# Patient Record
Sex: MALE | URBAN - METROPOLITAN AREA
[De-identification: names, ages, dates, MRNs, and addresses within clinical notes are randomized per-mention and may not be internally consistent; named-entity substitution may affect disease eponyms.]

---

## 2018-03-22 ENCOUNTER — TRANSFERRED RECORDS (OUTPATIENT)
Dept: HEALTH INFORMATION MANAGEMENT | Facility: CLINIC | Age: 23
End: 2018-03-22

## 2018-03-30 ENCOUNTER — MEDICAL CORRESPONDENCE (OUTPATIENT)
Dept: HEALTH INFORMATION MANAGEMENT | Facility: CLINIC | Age: 23
End: 2018-03-30

## 2018-03-30 ENCOUNTER — TRANSFERRED RECORDS (OUTPATIENT)
Dept: HEALTH INFORMATION MANAGEMENT | Facility: CLINIC | Age: 23
End: 2018-03-30

## 2018-03-31 ENCOUNTER — RADIANT APPOINTMENT (OUTPATIENT)
Dept: ULTRASOUND IMAGING | Facility: CLINIC | Age: 23
End: 2018-03-31
Attending: FAMILY MEDICINE
Payer: COMMERCIAL

## 2018-03-31 DIAGNOSIS — R59.0 PREAURICULAR LYMPHADENOPATHY: ICD-10-CM

## 2018-03-31 DIAGNOSIS — R59.0 POSTERIOR CERVICAL LYMPHADENOPATHY: ICD-10-CM

## 2018-04-03 NOTE — TELEPHONE ENCOUNTER
FUTURE VISIT INFORMATION      FUTURE VISIT INFORMATION:    Date: 4/4/18    Time: 3:20PM    Location: [facility] E[facility]  REFERRAL INFORMATION:    Referring provider:  Dr Crystal Chisholm    Referring providers clinic:  [facility]    Reason for visit/diagnosis  Swollen Lymph Nodes    RECORDS REQUESTED FROM:       Clinic name Comments Records Status Imaging Status   [facility] 3/31/18 US Head Neck EPIC PACS   [facility] 4/2/18 visit notes Dr Chisholm Scanned 3/30                              RECORDS STATUS

## 2018-04-04 ENCOUNTER — OFFICE VISIT (OUTPATIENT)
Dept: OTOLARYNGOLOGY | Facility: CLINIC | Age: 23
End: 2018-04-04
Payer: COMMERCIAL

## 2018-04-04 ENCOUNTER — PRE VISIT (OUTPATIENT)
Dept: OTOLARYNGOLOGY | Facility: CLINIC | Age: 23
End: 2018-04-04

## 2018-04-04 VITALS
HEART RATE: 77 BPM | BODY MASS INDEX: 21.7 KG/M2 | TEMPERATURE: 98.4 F | OXYGEN SATURATION: 99 % | WEIGHT: 155 LBS | SYSTOLIC BLOOD PRESSURE: 132 MMHG | DIASTOLIC BLOOD PRESSURE: 77 MMHG | HEIGHT: 71 IN

## 2018-04-04 DIAGNOSIS — R59.1 LYMPHADENOPATHY: Primary | ICD-10-CM

## 2018-04-04 ASSESSMENT — PAIN SCALES - GENERAL: PAINLEVEL: NO PAIN (0)

## 2018-04-04 NOTE — PATIENT INSTRUCTIONS
1. Call if you would like to proceed with an ultrasound guided biopsy.   2. Please call the ENT clinic with any questions,concerns, new or worsening symptoms.    -Clinic number is [phone]   - Lety's direct line (Dr. Rob's nurse) [phone]

## 2018-04-04 NOTE — MR AVS SNAPSHOT
After Visit Summary   2018    Eren Rodas    MRN: [id number]           Patient Information     Date Of Birth          1995        Visit Information        Provider Department      2018 3:20 PM Jessica Rob MD [facility]        Today's Diagnoses     Lymphadenopathy    -  1      Care Instructions    1. Call if you would like to proceed with an ultrasound guided biopsy.   2. Please call the ENT clinic with any questions,concerns, new or worsening symptoms.    -Clinic number is [phone]   - Let[facility] direct line (Dr. Rob's nurse) [phone]              Follow-ups after your visit        Who to contact     Please call your clinic at [phone] to:    Ask questions about your health    Make or cancel appointments    Discuss your medicines    Learn about your test results    Speak to your doctor            Additional Information About Your Visit        MyChart Information     BeatSwitch is an electronic gateway that provides easy, online access to your medical records. With BeatSwitch, you can request a clinic appointment, read your test results, renew a prescription or communicate with your care team.     To sign up for [email or web address] visit the website at [email or web address]   You will be asked to enter the access code listed below, as well as some personal information. Please follow the directions to create your username and password.     Your access code is: [email or web address]  Expires: 2018  6:31 AM     Your access code will  in 90 days. If you need help or a new code, please contact your [facility] or call [phone] for assistance.        Care EveryWhere ID     This is your Care EveryWhere ID. This could be used by other organizations to access your [place] medical records  [id number]        Your Vitals Were     Pulse Temperature Height Pulse Oximetry BMI (Body Mass Index)       77 98.4  F (36.9  C) 1.791 m (5'  "10.5\") 99% 21.93 kg/m2        Blood Pressure from Last 3 Encounters:   04/04/18 132/77    Weight from Last 3 Encounters:   04/04/18 70.3 kg (155 lb)              We Performed the Following     IMAGESTREAM RECORDING ORDER        Primary Care Provider Office Phone # Fax #    [facility] MD Ambrose [phone]       [address] [place] [address]        Equal Access to Services     VENECIA AVALOS : Hadii aad ku hadasho Soomaali, waaxda luqadaha, qaybta kaalmada adeegyada, waxay idiin hayaan adeeg kharash la'juliocsear . So [facility] [phone].    ATENCIÓN: Si habla español, tiene a capone disposición servicios gratuitos de asistencia lingüística. Llame al [phone].    We comply with applicable federal civil rights laws and [place] laws. We do not discriminate on the basis of race, color, national origin, age, disability, sex, sexual orientation, or gender identity.            Thank you!     Thank you for choosing [facility]  for your care. Our goal is always to provide you with excellent care. Hearing back from our patients is one way we can continue to improve our services. Please take a few minutes to complete the written survey that you may receive in the mail after your visit with us. Thank you!             Your Updated Medication List - Protect others around you: Learn how to safely use, store and throw away your medicines at www.disposemymeds.org.      Notice  As of 4/4/2018 11:59 PM    You have not been prescribed any medications.      "

## 2018-04-04 NOTE — NURSING NOTE
Chief Complaint   Patient presents with     Consult     Swollen lymph nodes     Ivelisse Alexis Medical Assistant

## 2018-04-04 NOTE — LETTER
4/4/2018       RE: Eren Rodas  [address]  [facility] [address]     Dear Colleague,    Thank you for referring your patient, Eren Rodas, to the [facility] at [facility]. Please see a copy of my visit note below.    Dear Dr. Chisholm:    I had the pleasure of meeting Eren Rodas in consultation today at the [facility] at your request.     History of Present Illness:   Eren Rodas is a 22-year-old man who is referred for evaluation of lymphadenopathy.  He initially noticed it in June 2017.  At that point he was in [place].  They thought that he had a throat infection and she was placed on antibiotics.  It decreased in size but persisted.  He actually had an FNA sometime in June or July in [facility] which was reportedly consistent with viral reactive lymphadenopathy.  We do not have these results.  He was seen at [facility] for the lymphadenopathy.  He had an ultrasound performed which demonstrated small nodes with normal fatty hilum without obvious concerning features.  He is not having any pain associated with it.  He has occasional right ear pain and some headaches.  Otherwise he is having a sore throat, dysphagia, odynophagia, weight loss.  He denies any fevers, chills, night sweats.  His sleep is an abnormal pattern so does have fatigue but this is likely related to going to bed at 4 AM and getting up at 11 AM.    Past surgical history: Negative next    Past medical history: Negative, states he is otherwise healthy    Family history: Negative for head and neck cancers    Social history: Denies smoking, chewing tobacco, rare alcohol use.  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    MEDICATIONS:     No current outpatient prescriptions on file.       ALLERGIES:  Not on File    HABITS/SOCIAL HISTORY:   Denies smoking, chewing tobacco, rare alcohol use.   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    Social History     Social History     Marital status: Single     Spouse name: N/A     Number of children: N/A     Years of education: N/A     Occupational History     Not on file.     Social History Main Topics     Smoking status: Never Smoker     Smokeless tobacco: Never Used     Alcohol use Yes     Drug use: No     Sexual activity: No     Other Topics Concern     Not on file     Social History Narrative     No narrative on file       PAST MEDICAL HISTORY: No past medical history on file.     PAST SURGICAL HISTORY: No past surgical history on file.    FAMILY HISTORY:  No family history on file.    REVIEW OF SYSTEMS:  12 point ROS was negative other than the symptoms noted above in the HPI.  Patient Supplied Answers to Review of Systems  [facility] ROS 4/4/2018   Neurology Headache   Ears, Nose, Throat Nasal congestion or drainage   Hematologic Lymph node swelling         PHYSICAL EXAMINATION:   /77  Pulse 77  Temp 98.4  F (36.9  C)  Ht 1.791 m (5' 10.5\")  Wt 70.3 kg (155 lb)  SpO2 99%  BMI 21.93 kg/m2  Appearance:   normal; NAD, age-appropriate appearance, well-developed, normal habitus   Communication:   normal; communicates verbally, normal voice quality   Head/Face:   inspection -  Normal; no scars or visible lesions   Salivary glands -  Normal size, no tenderness, swelling, or palpable masses   Facial strength -  Normal and symmetric bilateral; H/B I/VI   Skin:  normal, no rash   Ocular Motility:  normal occular movements   Ears:  auricle (AD) -  normal  EAC (AD) -  normal  TM (AD) -  Normal, no effusion  auricle (AS) -  normal  EAC (AS) -  normal  TM (AS) -  Normal, no effusion  Normal clinical speech reception   Nose:  Ext. inspection -  Normal  Internal Inspection -  Normal mucosa, septum, and turbinates   Nasopharynx:  small amount of midline residual adenoid tissue   Oral Cavity:  lips -  Normal mucosa, oral " competence, and stoma size   Age-appropriate dentition, healthy gingival mucosa   Hard palate, buccal, floor of mouth mucosa normal   Tongue - normal movement, no lesions   Oropharynx:  mucosa -  Normal, no lesions  soft palate -  Normal, no lesions, no asymmetry, normal elevation  tonsils -  Normal, no exudates, no abnormal lesions, symmetric   Hypopharynx:  Normal pyriform sinus and pharyngeal wall mucosa   No pooled secretions    Larynx:  Epiglottis, false vocal cord, true vocal cord normal in appearance, bilaterally mobile cords    Neck: No visible mass or asymmetry   Normal palpation, no tenderness  Normal range of motion   Lymphatic:  approximately 1-1.5 cm node in right level V, freely mobile  Few other smaller nodes in right neck   Cardiovascular:  warm, pink, well-perfused extremities without swelling, tenderness, or edema   Respiratory:  Normal respiratory effort, no stridor   Neuro/Psych.:  mood/affect -  normal  mental status -  normal  cranial nerves -  normal        PROCEDURES:   Flexible fiberoptic laryngoscopy: Scope exam was indicated due to lymphadenopathy. Verbal consent was obtained. The nasal cavity was prepped with an aerosolized solution of topical anesthetic and vasoconstrictive agent. The scope was passed through the anterior nasal cavity and advanced. Inspection of the nasopharynx revealed no gross abnormality. Inspection of the larynx revealed bilaterally mobile vocal cords. Pyriform sinuses are symmetric. No intra-arytenoid irregularities noted. The epiglottis, aryepiglottic folds, vallecula and base of tongue are unremarkable. The airway is patent. Procedure tolerated well with no immediate complications noted.      RESULTS REVIEWED:   I reviewed the referral records from [facility] along with the U/S report    Right:  Level 2: There is a 1.5 x 0.5 x 2.2 cm hypoechoic node with fatty  hilum and no internal vascularity.  Level 3: No significant lymphadenopathy.  Level 4: No  significant lymphadenopathy.  Level 5:   a. There is a 1.5 x 0.3 x 1.6 cm hypoechoic node with thin fatty hilum  and no substantial vascularity. This node is felt by the patient.  b. There is a 0.6 x 0.2 x 0.8 cm hypoechoic node within fatty hilum  and minimal associated vascularity. This node is felt by the patient.  Level 6: No significant lymphadenopathy.  Level 7: No significant lymphadenopathy.  The periauricular region there is a 0.7 x 0.2 x 0.8 cm hypoechoic node  with fatty hilum and no internal vascularity.     Left:  Level 2: There is a 1.2 x 0.7 x 1.4 cm hypoechoic node without fatty  hilum or substantial internal vascularity.  Level 3: No significant lymphadenopathy.  Level 4: No significant lymphadenopathy.  Level 5: There is a 0.9 x 0.5 x 0.7 cm hypoechoic node with a fatty  hilum and no internal vascularity.  Level 6: No significant lymphadenopathy.  Level 7: No significant lymphadenopathy.     The right lobe of the thyroid measures 1.7 x 2.0 x 4.8 cm. The isthmus  measures 2.5 mm in depth. The left lobe of the thyroid measures 1.6 x  1.4 x 4.5 cm. Thyroid parenchyma is homogeneous.         IMPRESSION:  1. Soft tissue neck ultrasound with lymph node measurements as  described above. No lymph nodes with suspicious characteristics.  2. Limited evaluation of the thyroid demonstrates no focal  abnormality.    IMPRESSION AND PLAN:   Eren Melo is a 22-year-old man with cervical lymphadenopathy.  We discussed that it certainly reassuring that it has decreased in size and his ultrasound report is consistent with reactive process and has no concerning features.  I discussed with him that he has already had an FNA that was reportedly negative in [place] which we do not the results of.  We can certainly offer him a repeat FNA if he desires.  He would like to think about this and will let us know.  He can be scheduled in clinic at his convenience for that he would like to proceed.  Alternatively he can go  undergo observation and certainly should be reevaluated if the nodes increase in size.    CC:  Crystal Chisholm MD  [address] [address]      Again, thank you for allowing me to participate in the care of your patient.      Sincerely,    Jessica Rob MD

## 2018-04-07 NOTE — PROGRESS NOTES
Dear Dr. Chisholm:    I had the pleasure of meeting Eren Rodas in consultation today at the [facility] at your request.     History of Present Illness:   Eren Rodas is a 22-year-old man who is referred for evaluation of lymphadenopathy.  He initially noticed it in June 2017.  At that point he was in [place].  They thought that he had a throat infection and she was placed on antibiotics.  It decreased in size but persisted.  He actually had an FNA sometime in June or July in [place] which was reportedly consistent with viral reactive lymphadenopathy.  We do not have these results.  He was seen at [facility] for the lymphadenopathy.  He had an ultrasound performed which demonstrated small nodes with normal fatty hilum without obvious concerning features.  He is not having any pain associated with it.  He has occasional right ear pain and some headaches.  Otherwise he is having a sore throat, dysphagia, odynophagia, weight loss.  He denies any fevers, chills, night sweats.  His sleep is an abnormal pattern so does have fatigue but this is likely related to going to bed at 4 AM and getting up at 11 AM.    Past surgical history: Negative next    Past medical history: Negative, states he is otherwise healthy    Family history: Negative for head and neck cancers    Social history: Denies smoking, chewing tobacco, rare alcohol use.  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    MEDICATIONS:     No current outpatient prescriptions on file.       ALLERGIES:  Not on File    HABITS/SOCIAL HISTORY:   Denies smoking, chewing tobacco, rare alcohol use.    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    Social History     Social History     Marital status: Single     Spouse name: N/A     Number of children: N/A     Years of education: N/A     Occupational History     Not on file.     Social History Main  "Topics     Smoking status: Never Smoker     Smokeless tobacco: Never Used     Alcohol use Yes     Drug use: No     Sexual activity: No     Other Topics Concern     Not on file     Social History Narrative     No narrative on file       PAST MEDICAL HISTORY: No past medical history on file.     PAST SURGICAL HISTORY: No past surgical history on file.    FAMILY HISTORY:  No family history on file.    REVIEW OF SYSTEMS:  12 point ROS was negative other than the symptoms noted above in the HPI.  Patient Supplied Answers to Review of Systems  [facility] ROS 4/4/2018   Neurology Headache   Ears, Nose, Throat Nasal congestion or drainage   Hematologic Lymph node swelling         PHYSICAL EXAMINATION:   /77  Pulse 77  Temp 98.4  F (36.9  C)  Ht 1.791 m (5' 10.5\")  Wt 70.3 kg (155 lb)  SpO2 99%  BMI 21.93 kg/m2  Appearance:   normal; NAD, age-appropriate appearance, well-developed, normal habitus   Communication:   normal; communicates verbally, normal voice quality   Head/Face:   inspection -  Normal; no scars or visible lesions   Salivary glands -  Normal size, no tenderness, swelling, or palpable masses   Facial strength -  Normal and symmetric bilateral; H/B I/VI   Skin:  normal, no rash   Ocular Motility:  normal occular movements   Ears:  auricle (AD) -  normal  EAC (AD) -  normal  TM (AD) -  Normal, no effusion  auricle (AS) -  normal  EAC (AS) -  normal  TM (AS) -  Normal, no effusion  Normal clinical speech reception   Nose:  Ext. inspection -  Normal  Internal Inspection -  Normal mucosa, septum, and turbinates   Nasopharynx:  small amount of midline residual adenoid tissue   Oral Cavity:  lips -  Normal mucosa, oral competence, and stoma size   Age-appropriate dentition, healthy gingival mucosa   Hard palate, buccal, floor of mouth mucosa normal   Tongue - normal movement, no lesions   Oropharynx:  mucosa -  Normal, no lesions  soft palate -  Normal, no lesions, no asymmetry, normal elevation  tonsils -  " Normal, no exudates, no abnormal lesions, symmetric   Hypopharynx:  Normal pyriform sinus and pharyngeal wall mucosa   No pooled secretions    Larynx:  Epiglottis, false vocal cord, true vocal cord normal in appearance, bilaterally mobile cords    Neck: No visible mass or asymmetry   Normal palpation, no tenderness  Normal range of motion   Lymphatic:  approximately 1-1.5 cm node in right level V, freely mobile  Few other smaller nodes in right neck   Cardiovascular:  warm, pink, well-perfused extremities without swelling, tenderness, or edema   Respiratory:  Normal respiratory effort, no stridor   Neuro/Psych.:  mood/affect -  normal  mental status -  normal  cranial nerves -  normal        PROCEDURES:   Flexible fiberoptic laryngoscopy: Scope exam was indicated due to lymphadenopathy. Verbal consent was obtained. The nasal cavity was prepped with an aerosolized solution of topical anesthetic and vasoconstrictive agent. The scope was passed through the anterior nasal cavity and advanced. Inspection of the nasopharynx revealed no gross abnormality. Inspection of the larynx revealed bilaterally mobile vocal cords. Pyriform sinuses are symmetric. No intra-arytenoid irregularities noted. The epiglottis, aryepiglottic folds, vallecula and base of tongue are unremarkable. The airway is patent. Procedure tolerated well with no immediate complications noted.      RESULTS REVIEWED:   I reviewed the referral records from [facility] along with the U/S report    Right:  Level 2: There is a 1.5 x 0.5 x 2.2 cm hypoechoic node with fatty  hilum and no internal vascularity.  Level 3: No significant lymphadenopathy.  Level 4: No significant lymphadenopathy.  Level 5:   a. There is a 1.5 x 0.3 x 1.6 cm hypoechoic node with thin fatty hilum  and no substantial vascularity. This node is felt by the patient.  b. There is a 0.6 x 0.2 x 0.8 cm hypoechoic node within fatty hilum  and minimal associated vascularity. This node is felt  by the patient.  Level 6: No significant lymphadenopathy.  Level 7: No significant lymphadenopathy.  The periauricular region there is a 0.7 x 0.2 x 0.8 cm hypoechoic node  with fatty hilum and no internal vascularity.     Left:  Level 2: There is a 1.2 x 0.7 x 1.4 cm hypoechoic node without fatty  hilum or substantial internal vascularity.  Level 3: No significant lymphadenopathy.  Level 4: No significant lymphadenopathy.  Level 5: There is a 0.9 x 0.5 x 0.7 cm hypoechoic node with a fatty  hilum and no internal vascularity.  Level 6: No significant lymphadenopathy.  Level 7: No significant lymphadenopathy.     The right lobe of the thyroid measures 1.7 x 2.0 x 4.8 cm. The isthmus  measures 2.5 mm in depth. The left lobe of the thyroid measures 1.6 x  1.4 x 4.5 cm. Thyroid parenchyma is homogeneous.         IMPRESSION:  1. Soft tissue neck ultrasound with lymph node measurements as  described above. No lymph nodes with suspicious characteristics.  2. Limited evaluation of the thyroid demonstrates no focal  abnormality.    IMPRESSION AND PLAN:   Eren Melo is a 22-year-old man with cervical lymphadenopathy.  We discussed that it certainly reassuring that it has decreased in size and his ultrasound report is consistent with reactive process and has no concerning features.  I discussed with him that he has already had an FNA that was reportedly negative in [place] which we do not the results of.  We can certainly offer him a repeat FNA if he desires.  He would like to think about this and will let us know.  He can be scheduled in clinic at his convenience for that he would like to proceed.  Alternatively he can go undergo observation and certainly should be reevaluated if the nodes increase in size.    Thank you very much for the opportunity to participate in the care of your patient.      Jessica Rob M.D.  Otolaryngology- Head & Neck Surgery        CC:  Crystal Chisholm MD  [facility]  [address]  [facility] [address]

## 2019-02-15 ENCOUNTER — MEDICAL CORRESPONDENCE (OUTPATIENT)
Dept: HEALTH INFORMATION MANAGEMENT | Facility: CLINIC | Age: 24
End: 2019-02-15

## 2019-02-15 ENCOUNTER — TELEPHONE (OUTPATIENT)
Dept: ORTHOPEDICS | Facility: CLINIC | Age: 24
End: 2019-02-15

## 2019-02-15 ENCOUNTER — TRANSFERRED RECORDS (OUTPATIENT)
Dept: HEALTH INFORMATION MANAGEMENT | Facility: CLINIC | Age: 24
End: 2019-02-15

## 2019-02-15 NOTE — TELEPHONE ENCOUNTER
[facility] Call Center    Phone Message    May a detailed message be left on voicemail: yes    Reason for Call: Other: [facility] with ortho/spine specialist for Lumbar Radiculopathy. Pt has a scheduled MRI on 2/18/2019. Pt wants to schedule an appt with a spine surgeon to go over MRI but when asked, Pt does not prefer surgery over conservative care. Please follow up with Pt to discuss scheduling options and how the appt should be scheduled (with surgeon or not).      Action Taken: Message routed to:  [facility] ([facility]o Clinic

## 2019-02-18 ENCOUNTER — HOSPITAL ENCOUNTER (OUTPATIENT)
Dept: MRI IMAGING | Facility: CLINIC | Age: 24
Discharge: HOME | End: 2019-02-18
Attending: INTERNAL MEDICINE | Admitting: INTERNAL MEDICINE
Payer: COMMERCIAL

## 2019-02-18 DIAGNOSIS — M54.16 LUMBAR RADICULOPATHY: ICD-10-CM

## 2019-02-18 PROCEDURE — 72148 MRI LUMBAR SPINE W/O DYE: CPT

## 2019-02-20 DIAGNOSIS — M54.50 LUMBAR PAIN: Primary | ICD-10-CM

## 2019-02-20 NOTE — TELEPHONE ENCOUNTER
RECORDS RECEIVED FROM: Chronic Lumbar radiculopathy, referred by Dr. Pitts at [facility], records and referral will be sent - per pt   DATE RECEIVED: 02/19/19    NOTES STATUS DETAILS   OFFICE NOTE from referring provider Received Dr. Pitts 1/28/19   OFFICE NOTE from other specialist N/A    DISCHARGE SUMMARY from hospital N/A    DISCHARGE REPORT from the ER N/A    OPERATIVE REPORT N/A    MEDICATION LIST Received    IMPLANT RECORD/STICKER N/A    LABS     CBC/DIFF N/A    CULTURES N/A    INJECTIONS DONE IN RADIOLOGY N/A    MRI Received 2/18/19   CT SCAN N/A    XRAYS (IMAGES & REPORTS) N/A    TUMOR     PATHOLOGY  Slides & report N/A

## 2019-02-22 ENCOUNTER — PRE VISIT (OUTPATIENT)
Dept: ORTHOPEDICS | Facility: CLINIC | Age: 24
End: 2019-02-22

## 2019-04-12 ENCOUNTER — ANCILLARY PROCEDURE (OUTPATIENT)
Dept: GENERAL RADIOLOGY | Facility: CLINIC | Age: 24
End: 2019-04-12
Attending: ORTHOPAEDIC SURGERY
Payer: COMMERCIAL

## 2019-04-12 ENCOUNTER — OFFICE VISIT (OUTPATIENT)
Dept: ORTHOPEDICS | Facility: CLINIC | Age: 24
End: 2019-04-12
Payer: COMMERCIAL

## 2019-04-12 VITALS — HEIGHT: 70 IN | BODY MASS INDEX: 21.76 KG/M2 | WEIGHT: 152 LBS

## 2019-04-12 DIAGNOSIS — M79.2 NERVE PAIN: Primary | ICD-10-CM

## 2019-04-12 ASSESSMENT — ENCOUNTER SYMPTOMS
MYALGIAS: 0
ARTHRALGIAS: 0
NECK PAIN: 1
TINGLING: 1
BACK PAIN: 1
SEIZURES: 0
SPEECH CHANGE: 0
DIZZINESS: 0
LOSS OF CONSCIOUSNESS: 0
NUMBNESS: 1
WEAKNESS: 0
STIFFNESS: 1
TREMORS: 0
DISTURBANCES IN COORDINATION: 0
MUSCLE CRAMPS: 0
HEADACHES: 0
JOINT SWELLING: 0
MEMORY LOSS: 0
MUSCLE WEAKNESS: 0
PARALYSIS: 0

## 2019-04-12 ASSESSMENT — MIFFLIN-ST. JEOR: SCORE: 1675.72

## 2019-04-12 NOTE — PROGRESS NOTES
"Spine Surgery Consultation    REFERRING PHYSICIAN: Diego Pitts   PRIMARY CARE PHYSICIAN: [facility]           Chief Complaint:   Consult (chronic lumbar radiculopathy )      History of Present Illness:  Symptom Profile Including: location of symptoms, onset, severity, exacerbating/alleviating factors, previous treatments:        Eren Rodas is a 23 year old male who presents for evaluation of diffuse paresthesias in his legs which have been going on for a number of months but particularly worsening over the last 2 weeks.  They seem to come on whenever he is sitting for more than 10 minutes and they are also present at nighttime.  He does not notice them as much when he is up and moving.  Initially they were just in the legs but perhaps over the last week or 2 he has started noticing a somewhat in the hands as well.  He is been seen by his primary care doctor.  He has done some physical therapy.  No gabapentin.  No injections.         Past Medical History:   No past medical history on file.         Past Surgical History:   No past surgical history on file.         Social History:     Social History     Tobacco Use     Smoking status: Never Smoker     Smokeless tobacco: Never Used   Substance Use Topics     Alcohol use: Yes            Family History:   No family history on file.         Allergies:   No Known Allergies         Medications:     No current outpatient medications on file.     No current facility-administered medications for this visit.              Review of Systems:     A 10 point ROS was performed and reviewed. Specific responses to these questions are noted at the end of the document.         Physical Exam:   Vitals: Ht 1.77 m (5' 9.69\")   Wt 68.9 kg (152 lb)   BMI 22.01 kg/m    Constitutional: awake, alert, cooperative, no apparent distress, appears stated age.    Eyes: The sclera are white.  Ears, Nose, Throat: The trachea is midline.  Psychiatric: The patient has a normal " affect.  Respiratory: breathing non-labored  Cardiovascular: The extremities are warm and perfused.  Skin: no obvious rashes or lesions.  Musculoskeletal, Neurologic, and Spine:          Lumbar Spine:    Appearance - No gross stepoffs or deformities    Motor -     L2-3: Hip flexion 5/5 R and 5/5 L strength          L3/4:  Knee extension R 5/5 and L 5/5 strength         L4/5:  Foot dorsiflexion R 5/5 L 5/5 and       EHL dorsiflexion R 5/5 L 5/5 strength         S1:  Plantarflexion/Peroneal Muscles  R 5/5 and L 5/5 strength    Sensation: intact to light touch L3-S1 distribution BLE      Neurologic:      REFLEXES Left Right                  Patella 1+ 1+   Ankle jerk 1+ 1+   Babinski No upgoing great toe No upgoing great toe   Clonus 0 beats 0 beats     Hip Exam:  No pain with hip log roll and no tenderness over the greater trochanters.    Alignment:  Patient stands with a neutral standing sagittal balance.           Imaging:   We ordered and independently reviewed new radiographs at this clinic visit. The results were discussed with the patient.  Findings include:    April 12, 2019 lumbar AP lateral flexion-extension radiographs show preserved alignment.  No obvious fractures or instability.      Lumbar spine MRI February 18, 2019: No severe central or foraminal stenosis.             Assessment and Plan:   Assessment:  23 year old male with diffuse paresthesias of unclear etiology.  I see no spine pathology on his imaging studies.     Plan:  1. We a long discussion today about options.  I told him the possible diagnoses include restless leg symptoms or possible neuropathy.  One option would be to get an EMG study.  However he has a completely normal exam for me and I think the likelihood that a EMG would be positive is low.  Nonetheless if he did not improve I would probably recommend that he get this just to make sure we are not missing an underlying neuropathy.  In terms of treatment I recommended he start with  treating this is possible restless leg syndrome and try gabapentin and asked him to speak to his primary care doctor about this.  If he did not improve with gabapentin I would recommend a referral to neurology to try and work him up for a neuropathy.  An EMG would also be potentially reasonable at the time of that referral.  I see no indication for spine surgery in the lumbar spine at this time.      Respectfully,  Michele Soni MD  Spine Surgery  [facility]      Answers for HPI/ROS submitted by the patient on 4/12/2019   General Symptoms: No  Skin Symptoms: No  HENT Symptoms: No  EYE SYMPTOMS: No  HEART SYMPTOMS: No  LUNG SYMPTOMS: No  INTESTINAL SYMPTOMS: No  URINARY SYMPTOMS: No  REPRODUCTIVE SYMPTOMS: No  SKELETAL SYMPTOMS: Yes  BLOOD SYMPTOMS: No  NERVOUS SYSTEM SYMPTOMS: Yes  MENTAL HEALTH SYMPTOMS: No  Back pain: Yes  Muscle aches: No  Neck pain: Yes  Swollen joints: No  Joint pain: No  Bone pain: No  Muscle cramps: No  Muscle weakness: No  Joint stiffness: Yes  Bone fracture: No  Trouble with coordination: No  Dizziness or trouble with balance: No  Fainting or black-out spells: No  Memory loss: No  Headache: No  Seizures: No  Speech problems: No  Tingling: Yes  Tremor: No  Weakness: No  Difficulty walking: No  Paralysis: No  Numbness: Yes

## 2019-04-12 NOTE — NURSING NOTE
"Reason For Visit:   Chief Complaint   Patient presents with     Consult     chronic lumbar radiculopathy        Primary MD: [facility]  Ref. MD: kelly     ?  No  Occupation no .  Currently working? No.  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  Date of injury: january   Type of injury: chronic .  Date of surgery: none   Type of surgery: none .  Smoker: No  Request smoking cessation information: No    Ht 1.77 m (5' 9.69\")   Wt 68.9 kg (152 lb)   BMI 22.01 kg/m      Pain Assessment  Patient Currently in Pain: Yes  0-10 Pain Scale: 2  Primary Pain Location: Back  Pain Descriptors: Radiating(numbness on right foot )    Oswestry (MEMO) Questionnaire    OSWESTRY DISABILITY INDEX 4/12/2019   Count 9   Sum 16   Oswestry Score (%) 35.56            Neck Disability Index (NDI) Questionnaire    No flowsheet data found.                Promis 10 Assessment    PROMIS 10 4/12/2019   In general, would you say your health is: Very good   In general, would you say your quality of life is: Very good   In general, how would you rate your physical health? Good   In general, how would you rate your mental health, including your mood and your ability to think? Excellent   In general, how would you rate your satisfaction with your social activities and relationships? Very good   In general, please rate how well you carry out your usual social activities and roles Good   To what extent are you able to carry out your everyday physical activities such as walking, climbing stairs, carrying groceries, or moving a chair? Moderately   How often have you been bothered by emotional problems such as feeling anxious, depressed or irritable? Never   How would you rate your fatigue on average? None   How would you rate your pain on average?   0 = No Pain  to  10 = Worst Imaginable Pain 6   In general, would you say your health is: 4   In general, would you say your quality of life is: 4   In general, how would you rate your physical health? 3 "   In general, how would you rate your mental health, including your mood and your ability to think? 5   In general, how would you rate your satisfaction with your social activities and relationships? 4   In general, please rate how well you carry out your usual social activities and roles. (This includes activities at home, at work and in your community, and responsibilities as a parent, child, spouse, employee, friend, etc.) 3   To what extent are you able to carry out your everyday physical activities such as walking, climbing stairs, carrying groceries, or moving a chair? 3   In the past 7 days, how often have you been bothered by emotional problems such as feeling anxious, depressed, or irritable? 1   In the past 7 days, how would you rate your fatigue on average? 1   In the past 7 days, how would you rate your pain on average, where 0 means no pain, and 10 means worst imaginable pain? 6   Global Mental Health Score 18   Global Physical Health Score 14   PROMIS TOTAL - SUBSCORES 32                Rishi Cruz ATC

## 2019-04-12 NOTE — LETTER
4/12/2019       RE: Eren Rodas  [address]  [facility] [address]     Dear Colleague,    Thank you for referring your patient, Eren Rodas, to the [facility]. Please see a copy of my visit note below.    Spine Surgery Consultation    REFERRING PHYSICIAN: Diego Pitts   PRIMARY CARE PHYSICIAN: [facility]           Chief Complaint:   Consult (chronic lumbar radiculopathy )      History of Present Illness:  Symptom Profile Including: location of symptoms, onset, severity, exacerbating/alleviating factors, previous treatments:        Eren Rodas is a 23 year old male who presents for evaluation of diffuse paresthesias in his legs which have been going on for a number of months but particularly worsening over the last 2 weeks.  They seem to come on whenever he is sitting for more than 10 minutes and they are also present at nighttime.  He does not notice them as much when he is up and moving.  Initially they were just in the legs but perhaps over the last week or 2 he has started noticing a somewhat in the hands as well.  He is been seen by his primary care doctor.  He has done some physical therapy.  No gabapentin.  No injections.         Past Medical History:   No past medical history on file.         Past Surgical History:   No past surgical history on file.         Social History:     Social History     Tobacco Use     Smoking status: Never Smoker     Smokeless tobacco: Never Used   Substance Use Topics     Alcohol use: Yes            Family History:   No family history on file.         Allergies:   No Known Allergies         Medications:     No current outpatient medications on file.     No current facility-administered medications for this visit.              Review of Systems:     A 10 point ROS was performed and reviewed. Specific responses to these questions are noted at the end of the  "document.         Physical Exam:   Vitals: Ht 1.77 m (5' 9.69\")   Wt 68.9 kg (152 lb)   BMI 22.01 kg/m     Constitutional: awake, alert, cooperative, no apparent distress, appears stated age.    Eyes: The sclera are white.  Ears, Nose, Throat: The trachea is midline.  Psychiatric: The patient has a normal affect.  Respiratory: breathing non-labored  Cardiovascular: The extremities are warm and perfused.  Skin: no obvious rashes or lesions.  Musculoskeletal, Neurologic, and Spine:          Lumbar Spine:    Appearance - No gross stepoffs or deformities    Motor -     L2-3: Hip flexion 5/5 R and 5/5 L strength          L3/4:  Knee extension R 5/5 and L 5/5 strength         L4/5:  Foot dorsiflexion R 5/5 L 5/5 and       EHL dorsiflexion R 5/5 L 5/5 strength         S1:  Plantarflexion/Peroneal Muscles  R 5/5 and L 5/5 strength    Sensation: intact to light touch L3-S1 distribution BLE      Neurologic:      REFLEXES Left Right                  Patella 1+ 1+   Ankle jerk 1+ 1+   Babinski No upgoing great toe No upgoing great toe   Clonus 0 beats 0 beats     Hip Exam:  No pain with hip log roll and no tenderness over the greater trochanters.    Alignment:  Patient stands with a neutral standing sagittal balance.           Imaging:   We ordered and independently reviewed new radiographs at this clinic visit. The results were discussed with the patient.  Findings include:    April 12, 2019 lumbar AP lateral flexion-extension radiographs show preserved alignment.  No obvious fractures or instability.      Lumbar spine MRI February 18, 2019: No severe central or foraminal stenosis.             Assessment and Plan:   Assessment:  23 year old male with diffuse paresthesias of unclear etiology.  I see no spine pathology on his imaging studies.     Plan:  1. We a long discussion today about options.  I told him the possible diagnoses include restless leg symptoms or possible neuropathy.  One option would be to get an EMG study.  " However he has a completely normal exam for me and I think the likelihood that a EMG would be positive is low.  Nonetheless if he did not improve I would probably recommend that he get this just to make sure we are not missing an underlying neuropathy.  In terms of treatment I recommended he start with treating this is possible restless leg syndrome and try gabapentin and asked him to speak to his primary care doctor about this.  If he did not improve with gabapentin I would recommend a referral to neurology to try and work him up for a neuropathy.  An EMG would also be potentially reasonable at the time of that referral.  I see no indication for spine surgery in the lumbar spine at this time.    Respectfully,  Michele Soni MD  Spine Surgery  [facility]

## 2019-04-24 ENCOUNTER — OFFICE VISIT (OUTPATIENT)
Dept: NEUROLOGY | Facility: CLINIC | Age: 24
End: 2019-04-24
Attending: ORTHOPAEDIC SURGERY
Payer: COMMERCIAL

## 2019-04-24 VITALS
BODY MASS INDEX: 21.33 KG/M2 | TEMPERATURE: 98 F | HEART RATE: 82 BPM | DIASTOLIC BLOOD PRESSURE: 86 MMHG | SYSTOLIC BLOOD PRESSURE: 127 MMHG | RESPIRATION RATE: 16 BRPM | HEIGHT: 70 IN | WEIGHT: 149 LBS | OXYGEN SATURATION: 100 %

## 2019-04-24 DIAGNOSIS — R20.2 PARESTHESIAS: ICD-10-CM

## 2019-04-24 DIAGNOSIS — M54.6 ACUTE BILATERAL THORACIC BACK PAIN: Primary | ICD-10-CM

## 2019-04-24 DIAGNOSIS — R63.4 WEIGHT LOSS: ICD-10-CM

## 2019-04-24 ASSESSMENT — MIFFLIN-ST. JEOR: SCORE: 1662.11

## 2019-04-24 ASSESSMENT — PAIN SCALES - GENERAL: PAINLEVEL: NO PAIN (1)
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
## 2019-04-25 ENCOUNTER — HOSPITAL ENCOUNTER (OUTPATIENT)
Dept: MRI IMAGING | Facility: CLINIC | Age: 24
Discharge: HOME | End: 2019-04-25
Attending: PSYCHIATRY & NEUROLOGY | Admitting: PSYCHIATRY & NEUROLOGY
Payer: COMMERCIAL

## 2019-04-25 DIAGNOSIS — R20.2 PARESTHESIAS: ICD-10-CM

## 2019-04-25 DIAGNOSIS — R63.4 WEIGHT LOSS: ICD-10-CM

## 2019-04-25 DIAGNOSIS — M54.6 ACUTE BILATERAL THORACIC BACK PAIN: ICD-10-CM

## 2019-04-25 PROCEDURE — 72156 MRI NECK SPINE W/O & W/DYE: CPT

## 2019-04-25 PROCEDURE — 25500064 ZZH RX 255 OP 636: Performed by: PSYCHIATRY & NEUROLOGY

## 2019-04-25 PROCEDURE — A9585 GADOBUTROL INJECTION: HCPCS | Performed by: PSYCHIATRY & NEUROLOGY

## 2019-04-25 RX ORDER — GADOBUTROL 604.72 MG/ML
7.5 INJECTION INTRAVENOUS ONCE
Status: COMPLETED | OUTPATIENT
Start: 2019-04-25 | End: 2019-04-25

## 2019-04-25 RX ADMIN — GADOBUTROL 6.7 ML: 604.72 INJECTION INTRAVENOUS at 07:55

## 2019-04-26 ENCOUNTER — OFFICE VISIT (OUTPATIENT)
Dept: NEUROLOGY | Facility: CLINIC | Age: 24
End: 2019-04-26
Payer: COMMERCIAL

## 2019-04-26 ENCOUNTER — PRE VISIT (OUTPATIENT)
Dept: NEUROSURGERY | Facility: CLINIC | Age: 24
End: 2019-04-26

## 2019-04-26 ENCOUNTER — HOSPITAL ENCOUNTER (OUTPATIENT)
Dept: MRI IMAGING | Facility: CLINIC | Age: 24
Discharge: HOME | End: 2019-04-26
Attending: PSYCHIATRY & NEUROLOGY | Admitting: PSYCHIATRY & NEUROLOGY
Payer: COMMERCIAL

## 2019-04-26 VITALS
WEIGHT: 149 LBS | RESPIRATION RATE: 15 BRPM | DIASTOLIC BLOOD PRESSURE: 78 MMHG | TEMPERATURE: 98.1 F | HEART RATE: 81 BPM | BODY MASS INDEX: 21.33 KG/M2 | SYSTOLIC BLOOD PRESSURE: 132 MMHG | HEIGHT: 70 IN

## 2019-04-26 DIAGNOSIS — R20.2 PARESTHESIAS: ICD-10-CM

## 2019-04-26 DIAGNOSIS — R63.4 WEIGHT LOSS: ICD-10-CM

## 2019-04-26 DIAGNOSIS — M54.6 ACUTE BILATERAL THORACIC BACK PAIN: ICD-10-CM

## 2019-04-26 DIAGNOSIS — R63.4 WEIGHT LOSS: Primary | ICD-10-CM

## 2019-04-26 PROCEDURE — 72157 MRI CHEST SPINE W/O & W/DYE: CPT

## 2019-04-26 PROCEDURE — 25500064 ZZH RX 255 OP 636: Performed by: PSYCHIATRY & NEUROLOGY

## 2019-04-26 PROCEDURE — A9585 GADOBUTROL INJECTION: HCPCS | Performed by: PSYCHIATRY & NEUROLOGY

## 2019-04-26 RX ORDER — GADOBUTROL 604.72 MG/ML
7.5 INJECTION INTRAVENOUS ONCE
Status: COMPLETED | OUTPATIENT
Start: 2019-04-26 | End: 2019-04-26

## 2019-04-26 RX ADMIN — GADOBUTROL 7 ML: 604.72 INJECTION INTRAVENOUS at 12:23

## 2019-04-26 ASSESSMENT — PAIN SCALES - GENERAL: PAINLEVEL: NO PAIN (1)

## 2019-04-26 ASSESSMENT — MIFFLIN-ST. JEOR: SCORE: 1660.6

## 2019-04-26 NOTE — NURSING NOTE
Chief Complaint   Patient presents with     RECHECK     [facility] RETURN REVIEW IMAGES- NERVE PAIN       Davion Weber, EMT

## 2019-04-26 NOTE — TELEPHONE ENCOUNTER
"NEUROSURGERY PRE-VISIT DATA  NEUROSURGERY PRE-VISIT DATA  ON THE PHONE CALL     Date of call 04/26/2019   Date of appointment 06/13/2019   Reason for referral (match appointment comment) Acute bilateral thoracic back pain/ Paresthesias   Who made the initial call (patient, parent [name], referral source IN HOUSE REFFERAL   Name of referring provider DR. TOBI SILVA   Has the patient been seen for the referring problem?   If yes, by whom and where    Where and what tests have been done?      Previous surgeries for the  referred condition\"?  If yes, by whom and where  *Request operative reports(s).      REQUEST MEDICAL RECORDS     From where?    From whom (Specify Primary, Neurology, Neurosurgery, Orthopedics, ENT, DDS, Pain Mgt, Medical Oncologist, Radiation Oncologist)    Date of request ?    Who did you speak to?    Are records already in [facility] EPIC?  YES   Have pertinent, outside records received?  (OR notes, study reports and provider notes)      IMAGES and TESTS:     Has the patient had any images done? YES   What images? (Specify MRI, CT, X-ray, Bone scan, MRA/MRV, etc)  * Include When and where? YES   What tests? (EMG, EEG, lumbar puncture, etc)  *Include when and where      Are the images in PACS? YES   If not already in PACS, have images been pushed to PAC and when?        "

## 2019-04-26 NOTE — LETTER
2019       RE: Eren Rodas  [address]  [facility] [address]     Dear Colleague,    Thank you for referring your patient, Eren Rodas, to the [facility] NEUROLOGY at [facility]. Please see a copy of my visit note below.    Service Date: 2019      RE: Eren Rodas   MRN: [id number]   : 1995      Dear Dr. Soni:      This is in regard to followup on Eren Rodas.  The patient returned today with chief complaint of thoracic and lumbar spine pain as well as sensory disturbance involving his feet.      The patient urgently did have MRI scans done at my suggestion on  and 2019.  I reviewed these with him.  He had an MRI scan of the cervical spine that showed no abnormal enhancement within the spinal cord within the cecal sac nor in the cervical vertebrae.  He did have mild right foraminal narrowing at C3-C4 due to uncinate hypertrophy.  This was read by Dr. Linn.  The patient also had an MRI scan of the thoracic spine read by Dr. Anderson on the following day.  I went over this study also with him.  This showed mild accentuated kyphosis at T2-T3 with mild flattening of the spinal cord without definite myelopathic signal.  Dr. Anderson noted that he had mildly increased STIR signal and mild enhancement surrounding the basal vertebral vein in the T1 and T2 vertebrae which he presumed was benign.  I told the patient I was not certain as to what all this represented.  I did suggest he go ahead now and have further studies.      The patient denied to me again any serologic risk factors nor has he had any genital or oral ulcers.  He did review with me that he had started sleeping on the floor 4 weeks ago to help his low back pain, but that is when he started to have pain in his thoracic spine.  He said he has now stopped doing that.      The patient is going to go ahead and have  A cxr,CRP, sedimentation rate, ELP, MMA, B12  level as well as NMO done.  I have recommended that he have EMG testing and then have neurologic followup with me.  Depending on the test results, I will make other recommendations.      Thank you for allowing me to see this patient.       I spent 15 minutes with the patient today.  Over 50% of the time this involved counseling and coordination of care.        D: 2019   T: 2019   MT: AKA      Name:     CRIS RINCON   MRN:      [id number]        Account:      [id number]   :      1995           Service Date: 2019      Document: [id number]        Again, thank you for allowing me to participate in the care of your patient.      Sincerely,    Christoph Benrard MD    cc:   [address] [address]     Michele Soni MD   [address] [address]

## 2019-04-29 NOTE — PROGRESS NOTES
Service Date: 2019      Michele Soni MD   [address] [place] [address]      RE: Eren Rodas   MRN: [id number]   : 1995      Dear Dr. Soni:      This is in regard to followup on Eren Rodas.  The patient returned today with chief complaint of thoracic and lumbar spine pain as well as sensory disturbance involving his feet.      The patient urgently did have MRI scans done at my suggestion on  and 2019.  I reviewed these with him.  He had an MRI scan of the cervical spine that showed no abnormal enhancement within the spinal cord within the cecal sac nor in the cervical vertebrae.  He did have mild right foraminal narrowing at C3-C4 due to uncinate hypertrophy.  This was read by Dr. Linn.  The patient also had an MRI scan of the thoracic spine read by Dr. Anderson on the following day.  I went over this study also with him.  This showed mild accentuated kyphosis at T2-T3 with mild flattening of the spinal cord without definite myelopathic signal.  Dr. Anderson noted that he had mildly increased STIR signal and mild enhancement surrounding the basal vertebral vein in the T1 and T2 vertebrae which he presumed was benign.  I told the patient I was not certain as to what all this represented.  I did suggest he go ahead now and have further studies.      The patient denied to me again any serologic risk factors nor has he had any genital or oral ulcers.  He did review with me that he had started sleeping on the floor 4 weeks ago to help his low back pain, but that is when he started to have pain in his thoracic spine.  He said he has now stopped doing that.      The patient is going to go ahead and have  A cxr,CRP, sedimentation rate, ELP, MMA, B12 level as well as NMO done.  I have recommended that he have EMG testing and then have neurologic followup with me.  Depending on the test results, I will make other recommendations.      Thank you for  allowing me to see this patient.       Sincerely yours,       Tobi Bernard MD      I spent 15 minutes with the patient today.  Over 50% of the time this involved counseling and coordination of care.      cc:   [address] [address]         TOBI BERNARD MD             D: 2019   T: 2019   MT: AKA      Name:     CRIS RINCON   MRN:      [id number]        Account:      [id number]   :      1995           Service Date: 2019      Document: [id number]
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
## 2019-04-30 ENCOUNTER — TRANSFERRED RECORDS (OUTPATIENT)
Dept: HEALTH INFORMATION MANAGEMENT | Facility: CLINIC | Age: 24
End: 2019-04-30

## 2019-04-30 ENCOUNTER — OFFICE VISIT (OUTPATIENT)
Dept: NEUROLOGY | Facility: CLINIC | Age: 24
End: 2019-04-30
Attending: PSYCHIATRY & NEUROLOGY
Payer: COMMERCIAL

## 2019-04-30 DIAGNOSIS — R20.2 PARESTHESIAS: ICD-10-CM

## 2019-04-30 DIAGNOSIS — M54.6 ACUTE BILATERAL THORACIC BACK PAIN: ICD-10-CM

## 2019-04-30 DIAGNOSIS — R63.4 WEIGHT LOSS: ICD-10-CM

## 2019-04-30 NOTE — PROGRESS NOTES
[facility]  Electrodiagnostic Laboratory    Nerve Conduction & EMG Report      Patient: Eren Rodas  YOB: 1995  Patient ID: [id number]  Age: 23 Years 5 Months  Gender: Male    Referring Physician: Christoph Bernard MD    History & Examination:    Mr. Eren Rodas is a 23 year old man who presents to EMG lab for evaluation of pain in the thoracic and lumbar spine and sensory disturbances in the feet. Query generalized polyneuropathy versus thoracic and/or lumbosacral radiculopathy.     Techniques:    Sensory and motor conduction studies were done with surface recording electrodes. EMG was done with a concentric needle electrode.     Results:    The bilateral sural and right superficial peroneal antidromic sensory NCSs were normal. The bilateral tibial and right peroneal motor NCSs were normal. EMG of the right paraspinal muscles and right lower limb revealed increased insertional activity in the upper lumbar paraspinal muscles. EMG of the right upper thoracic paraspinal, tibialis anterior, medial gastrocnemius and vastus lateralis muscles was normal.     Interpretation:    Normal study. There is no electrophysiologic evidence of a generalized large-fiber polyneuropathy, right upper thoracic, or right lumbosacral radiculopathies.     EMG Physician:    Carla Whiteside DO   Neuromuscular Fellow    ATTENDING ADDENDUM: I was present during the entire study and directly supervised Fellow Dr Whiteside who performed it. I agree with the analysis of results and interpretation as above. Elkin Prieto MD        Sensory NCS      Nerve / Sites Rec. Site Onset Peak NP Amp Ref. PP Amp Dist Corey Ref. Temp     ms ms  V  V  V cm m/s m/s  C   R SURAL - Lat Mall      Calf Ankle 2.86 3.70 21.0 8.0 19.1 14 48.9 38.0 31.2   L SURAL - Lat Mall      Calf Ankle 2.86 3.70 21.6 8.0 27.5 14 48.9 38.0 31.1   R SUP PERONEAL      Lat Leg Cobb 2.76 3.65 11.1  12.5 12.5 45.3 38.0 31.3       Motor NCS       Nerve / Sites Rec. Site Lat Ref. Amp Ref. Rel Amp Dist Corey Ref. Dur. Area Temp.     ms ms mV mV % cm m/s m/s ms %  C   L TIBIAL - AH      Ankle AH 3.44 6.00 18.9 4.0 100 8   5.89 100 31   R PERONEAL - EDB      Ankle AH 3.75 6.00 8.4 4.0 100 8   6.46 100 31.2      Pop Fos AH 10.16  7.6  91.1 29 45.3 38.0 7.03 97.6 31.4      3 AH 11.98  7.5  90.1 9 49.4  6.82 97.6 31.4   R TIBIAL - AH      Site 1 Muscle 3.44  14.0  100    6.15 100 31.4      Site 2  11.93  9.8  70.2 40 47.1  7.34 74 31.4       EMG Summary Table     Spontaneous MUAP Recruitment    IA Fib/PSW Fasc H.F. Amp Dur. PPP Pattern   R. LUMB PSP (U) Increased None None None N N None Normal   R. THOR PSP (U)  None None None N N None Normal   R. TIB ANTERIOR N None None None N N None Normal   R. GASTROCN (MED) N None None None N N None Normal   R. VAST LATERALIS N None None None N N None Normal

## 2019-04-30 NOTE — LETTER
4/30/2019       RE: Eren Rodas  [address]  [facility] [address]     Dear Colleague,    Thank you for referring your patient, Eren Rodas, to the [facility] EMG at [facility]. Please see a copy of my visit note below.        [facility]  Electrodiagnostic Laboratory      Nerve Conduction & EMG Report      Patient: Eren Rodas  YOB: 1995  Patient ID: [id number]  Age: 23 Years 5 Months  Gender: Male    Referring Physician: Christoph Bernard MD    History & Examination:    Mr. Eren Rodas is a 23 year old man who presents to EMG lab for evaluation of pain in the thoracic and lumbar spine and sensory disturbances in the feet. Query generalized polyneuropathy versus thoracic and/or lumbosacral radiculopathy.     Techniques:    Sensory and motor conduction studies were done with surface recording electrodes. EMG was done with a concentric needle electrode.     Results:    The bilateral sural and right superficial peroneal antidromic sensory NCSs were normal. The bilateral tibial and right peroneal motor NCSs were normal. EMG of the right paraspinal muscles and right lower limb revealed increased insertional activity in the upper lumbar paraspinal muscles. EMG of the right upper thoracic paraspinal, tibialis anterior, medial gastrocnemius and vastus lateralis muscles was normal.     Interpretation:    Normal study. There is no electrophysiologic evidence of a generalized large-fiber polyneuropathy, right upper thoracic, or right lumbosacral radiculopathies.     EMG Physician:    Carla Whiteside DO   Neuromuscular Fellow    ATTENDING ADDENDUM: I was present during the entire study and directly supervised Fellow Dr Whiteside who performed it. I agree with the analysis of results and interpretation as above. Elkin Prieto MD      Sensory NCS      Nerve / Sites Rec. Site Onset Peak NP Amp Ref. PP Amp Dist Corey Ref. Temp      ms ms  V  V  V cm m/s m/s  C   R SURAL - Lat Mall      Calf Ankle 2.86 3.70 21.0 8.0 19.1 14 48.9 38.0 31.2   L SURAL - Lat Mall      Calf Ankle 2.86 3.70 21.6 8.0 27.5 14 48.9 38.0 31.1   R SUP PERONEAL      Lat Leg Cobb 2.76 3.65 11.1  12.5 12.5 45.3 38.0 31.3       Motor NCS      Nerve / Sites Rec. Site Lat Ref. Amp Ref. Rel Amp Dist Corey Ref. Dur. Area Temp.     ms ms mV mV % cm m/s m/s ms %  C   L TIBIAL - AH      Ankle AH 3.44 6.00 18.9 4.0 100 8   5.89 100 31   R PERONEAL - EDB      Ankle AH 3.75 6.00 8.4 4.0 100 8   6.46 100 31.2      Pop Fos AH 10.16  7.6  91.1 29 45.3 38.0 7.03 97.6 31.4      3 AH 11.98  7.5  90.1 9 49.4  6.82 97.6 31.4   R TIBIAL - AH      Site 1 Muscle 3.44  14.0  100    6.15 100 31.4      Site 2  11.93  9.8  70.2 40 47.1  7.34 74 31.4       EMG Summary Table     Spontaneous MUAP Recruitment    IA Fib/PSW Fasc H.F. Amp Dur. PPP Pattern   R. LUMB PSP (U) Increased None None None N N None Normal   R. THOR PSP (U)  None None None N N None Normal   R. TIB ANTERIOR N None None None N N None Normal   R. GASTROCN (MED) N None None None N N None Normal   R. VAST LATERALIS N None None None N N None Normal                      Again, thank you for allowing me to participate in the care of your patient.      Sincerely,    Elkin Prieto MD

## 2019-05-14 ENCOUNTER — ANCILLARY PROCEDURE (OUTPATIENT)
Dept: GENERAL RADIOLOGY | Facility: CLINIC | Age: 24
End: 2019-05-14
Attending: PSYCHIATRY & NEUROLOGY
Payer: COMMERCIAL

## 2019-05-14 DIAGNOSIS — M54.6 ACUTE BILATERAL THORACIC BACK PAIN: ICD-10-CM

## 2019-05-14 DIAGNOSIS — R20.2 PARESTHESIAS: ICD-10-CM

## 2019-05-14 DIAGNOSIS — R63.4 WEIGHT LOSS: ICD-10-CM

## 2019-05-15 LAB — IGG SERPL-MCNC: 1220 MG/DL (ref 695–1620)

## 2019-05-17 ENCOUNTER — OFFICE VISIT (OUTPATIENT)
Dept: NEUROLOGY | Facility: CLINIC | Age: 24
End: 2019-05-17
Payer: COMMERCIAL

## 2019-05-17 VITALS
HEART RATE: 92 BPM | HEIGHT: 70 IN | RESPIRATION RATE: 15 BRPM | BODY MASS INDEX: 21.9 KG/M2 | DIASTOLIC BLOOD PRESSURE: 82 MMHG | WEIGHT: 153 LBS | TEMPERATURE: 98 F | SYSTOLIC BLOOD PRESSURE: 124 MMHG | OXYGEN SATURATION: 98 %

## 2019-05-17 DIAGNOSIS — M54.6 MIDLINE THORACIC BACK PAIN, UNSPECIFIED CHRONICITY: Primary | ICD-10-CM

## 2019-05-17 ASSESSMENT — PAIN SCALES - GENERAL: PAINLEVEL: MILD PAIN (2)

## 2019-05-17 ASSESSMENT — MIFFLIN-ST. JEOR: SCORE: 1680.33

## 2019-05-17 NOTE — NURSING NOTE
Chief Complaint   Patient presents with     RECHECK     [facility] RETURN- NERVE PAIN       Davion Weber, EMT

## 2019-05-17 NOTE — LETTER
2019       RE: Eren Rodas  [address]  [facility] [address]     Dear Colleague,    Thank you for referring your patient, Eren Rodas, to the [facility] NEUROLOGY at [facility]. Please see a copy of my visit note below.    Service Date: 2019      MD Kels[facility]   [address] , [address]   [place] [address]      RE: Eren Rodas   MRN: [id number]   : 1995      Dear Dr. Soni:      This is in regard to followup on Eren Rodas.  The patient returned today with a chief complaint of spine pain, as well as paresthesias.      The patient said that he has continued to improve.  He notes a decrease in intensity and duration of the paresthesias involving his distal legs and feet.  He said that the symptoms do go away when he walks or he rests.  He said they still can last for hours.  He has noted a marked reduction in his thoracic and lumbar spine pain.  He said over the last 1-2 weeks, he has not been able to sit for up to 40 minutes without real discomfort.  He has been doing some stretching exercises.  The patient did discuss with me seeing a previous physical therapist through [facility].  He wondered about referral to that person and I thought it was an excellent idea.      I went over with the patient his various tests that were done.  The patient had normal IgG level, CRP, sed rate, B12 level of 415 picograms with normal MMA and essentially a normal ELP with no findings of a monoclonal protein but a slight elevation of albumin.  His EMG test that was done by Dr. Prieto on 2019 was normal.      The patient did undergo further testing that I reviewed with him.  On 2019, he had an MRI scan of the cervical spine was essentially normal.  He had some mild right foraminal narrowing at C3-4 due to right uncinate hypertrophy.  In the thoracic spine, he did  have an unusual finding.  I reviewed this with Dr. Chino, our expert spine neurosurgeon.  This showed that he had mild disk height narrowing at T2-3 with mild focal accentuation of kyphosis, with mild bow stringing of the cord and mild flattening of the left ventral surface of the spinal cord without myelopathic signal.  There was mildly increased STIR signal and mild enhancement surrounding the basal vertebral vein and the T1 and T2 vertebrae which was presumed to be benign.  I showed the actual films to the patient.      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  He has less paraspinal muscle spasm and has it fairly localized to the right mid thoracic area.  Otherwise, muscle stretch reflexes were hypoactive in the arms, absent at the knees with normal ankle jerks which is a normal pattern in a muscular man.  He had normal strength and he had a normal sensory examination including vibratory position sense testing.  He had normal range of motion of his back today.      I have talked with the patient about the possibility of an inflammatory process involving his thoracic spine.  He did have a negative chest x-ray that I had ordered earlier.  I reviewed that with him too.  He had negative serology done here and I have reviewed his prior lab tests done at [place] again with him.  He has never had tuberculosis or exposure to TB.  I have talked with him about undergoing spinal fluid examination to check inflammatory markers and also for cultures.  He is going to hold off as he feels he is markedly improved.  He is planning to move to [place] in approximately 5 weeks.  I have talked with him about making sure he has followup by a neurologist there.  In addition, I have asked that he see me before he leaves.  He thought that was a good idea and promised to return here in approximately 3-4 weeks and on a  p.r.n. basis.  He said he would consider a spinal fluid examination if his symptoms do not continue to improve.      Thank you for allowing me  to see this patient.      Sincerely yours,      Christoph Bernard MD      I spent 35 minutes with the patient today.  Over 50% of the time this involved counseling and coordination of care.      cc:   [address] [address]      D: 2019   T: 2019   MT: al      Name:     CRIS RINCON   MRN:      [id number]        Account:      [id number]   :      1995           Service Date: 2019      Document: [id number]

## 2019-05-18 NOTE — PROGRESS NOTES
Service Date: 2019      MD Kelsy Washburn [place]   [address] , [address]   [place] [address]      RE: Eren Rodas   MRN: [id number]   : 1995      Dear Dr. Soni:      This is in regard to followup on Eren Rodas.  The patient returned today with a chief complaint of spine pain, as well as paresthesias.      The patient said that he has continued to improve.  He notes a decrease in intensity and duration of the paresthesias involving his distal legs and feet.  He said that the symptoms do go away when he walks or he rests.  He said they still can last for hours.  He has noted a marked reduction in his thoracic and lumbar spine pain.  He said over the last 1-2 weeks, he has not been able to sit for up to 40 minutes without real discomfort.  He has been doing some stretching exercises.  The patient did discuss with me seeing a previous physical therapist through [facility].  He wondered about referral to that person and I thought it was an excellent idea.      I went over with the patient his various tests that were done.  The patient had normal IgG level, CRP, sed rate, B12 level of 415 picograms with normal MMA and essentially a normal ELP with no findings of a monoclonal protein but a slight elevation of albumin.  His EMG test that was done by Dr. Prieto on 2019 was normal.      The patient did undergo further testing that I reviewed with him.  On 2019, he had an MRI scan of the cervical spine was essentially normal.  He had some mild right foraminal narrowing at C3-4 due to right uncinate hypertrophy.  In the thoracic spine, he did have an unusual finding.  I reviewed this with Dr. Chino, our expert spine neurosurgeon.  This showed that he had mild disk height narrowing at T2-3 with mild focal accentuation of kyphosis, with mild bow stringing of the cord and mild flattening of the left ventral surface of the spinal cord  without myelopathic signal.  There was mildly increased STIR signal and mild enhancement surrounding the basal vertebral vein and the T1 and T2 vertebrae which was presumed to be benign.  I showed the actual films to the patient.      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  He has less paraspinal muscle spasm and has it fairly localized to the right mid thoracic area.  Otherwise, muscle stretch reflexes were hypoactive in the arms, absent at the knees with normal ankle jerks which is a normal pattern in a muscular man.  He had normal strength and he had a normal sensory examination including vibratory position sense testing.  He had normal range of motion of his back today.      I have talked with the patient about the possibility of an inflammatory process involving his thoracic spine.  He did have a negative chest x-ray that I had ordered earlier.  I reviewed that with him too.  He had negative serology done here and I have reviewed his prior lab tests done at [place] again with him.  He has never had tuberculosis or exposure to TB.  I have talked with him about undergoing spinal fluid examination to check inflammatory markers and also for cultures.  He is going to hold off as he feels he is markedly improved.  He is planning to move to [place] in approximately 5 weeks.  I have talked with him about making sure he has followup by a neurologist there.  In addition, I have asked that he see me before he leaves.  He thought that was a good idea and promised to return here in approximately 3-4 weeks and on a p.r.n. basis.  He said he would consider a spinal fluid examination if his symptoms do not continue to improve.      Thank you for allowing me  to see this patient.      Sincerely yours,         Christoph Bernard MD      I spent 35 minutes with the patient today.  Over 50% of the time this  involved counseling and coordination of care.      cc:   [address] [id number]         TOBI SILVA MD             D: 2019   T: 2019   MT: al      Name:     CRIS RINCON   MRN:      [id number]        Account:      [id number]   :      1995           Service Date: 2019      Document: [id number]

## 2019-05-29 ENCOUNTER — TELEPHONE (OUTPATIENT)
Facility: CLINIC | Age: 24
End: 2019-05-29

## 2020-03-11 ENCOUNTER — HEALTH MAINTENANCE LETTER (OUTPATIENT)
Age: 25
End: 2020-03-11

## 2021-01-03 ENCOUNTER — HEALTH MAINTENANCE LETTER (OUTPATIENT)
Age: 26
End: 2021-01-03

## 2021-04-25 ENCOUNTER — HEALTH MAINTENANCE LETTER (OUTPATIENT)
Age: 26
End: 2021-04-25

## 2021-10-10 ENCOUNTER — HEALTH MAINTENANCE LETTER (OUTPATIENT)
Age: 26
End: 2021-10-10

## 2022-05-21 ENCOUNTER — HEALTH MAINTENANCE LETTER (OUTPATIENT)
Age: 27
End: 2022-05-21

## 2022-09-18 ENCOUNTER — HEALTH MAINTENANCE LETTER (OUTPATIENT)
Age: 27
End: 2022-09-18

## 2023-06-04 ENCOUNTER — HEALTH MAINTENANCE LETTER (OUTPATIENT)
Age: 28
End: 2023-06-04